# Patient Record
Sex: FEMALE | Employment: FULL TIME | URBAN - METROPOLITAN AREA
[De-identification: names, ages, dates, MRNs, and addresses within clinical notes are randomized per-mention and may not be internally consistent; named-entity substitution may affect disease eponyms.]

---

## 2017-09-01 ENCOUNTER — HOSPITAL ENCOUNTER (OUTPATIENT)
Dept: PHYSICAL THERAPY | Age: 42
Discharge: HOME | End: 2017-09-01
Payer: COMMERCIAL

## 2017-09-01 PROCEDURE — 97110 THERAPEUTIC EXERCISES: CPT

## 2017-09-01 PROCEDURE — 97162 PT EVAL MOD COMPLEX 30 MIN: CPT

## 2017-09-01 NOTE — PROGRESS NOTES
[facility]  [address]  [place], [address]  [phone] fax    Plan of Care/ Statement of Necessity for Physical Therapy Services    Patient name: Doug Quintana Start of Care: 2017   Referral source: Daquan Ross MD : 1975    Medical Diagnosis: Pain in right knee [M25.561]  Chondromalacia, right knee [M94.261]   Onset Date: 6-7 weeks prior to onset. Treatment Diagnosis: R knee pain   Prior Hospitalization: see medical history Provider#: [id number]   Medications: Verified on Patient summary List    Comorbidities: Thyroid problems, Bilateral leg circulation/swelling, visual impaired (contacts), hearing impaired, thyroidectomy   Prior Level of Function: The patient states that she had no problems performing squats, stairs, and walking. The Plan of Care and following information is based on the information from the initial evaluation. Assessment/ key information: The patient is a 38year old female with a chief complaint of R knee pain that began with an onset when she was climbing a ladder at work. Since that time she has experienced pain on either side of her knee and below the patella. She does state she has had radiographs which were negative and MRI which showed cartilage involvement per patient. She has signs and symptoms consistent with knee pain with associated impairments consisting of pain, decreased ROM, decreased flexibility, decreased strength, and limited ADL efficiency. The patient will benefit from skilled PT in order to address the above impairments. Evaluation Complexity History HIGH Complexity :3+ comorbidities / personal factors will impact the outcome/ POC ; Examination MEDIUM Complexity : 3 Standardized tests and measures addressing body structure, function, activity limitation and / or participation in recreation  ;Presentation MEDIUM Complexity : Evolving with changing characteristics  ; Clinical Decision Making MEDIUM Complexity : FOTO score of 26-74  Overall Complexity Rating: MEDIUM  Problem List: pain affecting function, decrease ROM, decrease strength, edema affecting function, decrease ADL/ functional abilitiies, decrease activity tolerance, decrease flexibility/ joint mobility and decrease transfer abilities   Treatment Plan may include any combination of the following: Therapeutic exercise, Therapeutic activities, Neuromuscular re-education, Physical agent/modality, Manual therapy, Patient education, Self Care training and Functional mobility training  Patient / Family readiness to learn indicated by: asking questions, trying to perform skills and interest  Persons(s) to be included in education: patient (P)  Barriers to Learning/Limitations: None  Patient Goal (s): decrease in pain/swelling  Patient Self Reported Health Status: fair  Rehabilitation Potential: good    Short Term Goals: To be accomplished in 2 weeks:   1. The patient will be independent and compliant with HEP to maximize therapeutic benefit. 2. The patient will improve knee extension to 0 degrees to maximize ease of ambulation. Long Term Goals: To be accomplished in 4 weeks:   1. The patient will improve FOTO score to predicted value in order to maximize ADL ease. 2. The patient will improve knee extension strength to 4+/5 MMT to maximize stability in stance. 3. The patient will improve knee flexion to 120 degrees to improve ease of strength. 4. The patient will display equal stair negotiation to maximize ADL ease. Frequency / Duration: Patient to be seen 2 times per week for 4 weeks.     Patient/ Caregiver education and instruction: Diagnosis, prognosis, self care, activity modification and exercises   [x]  Plan of care has been reviewed with JEOVANNY Ochoa, PT 9/1/2017 4:07 PM    ________________________________________________________________________    I certify that the above Therapy Services are being furnished while the patient is under my care. I agree with the treatment plan and certify that this therapy is necessary.     [de-identified] Signature:____________________  Date:____________Time: _________    Please sign and return to [facility]  [address] [facility] [address]  [place], [address]  [phone] fax

## 2017-09-01 NOTE — PROGRESS NOTES
PT DAILY TREATMENT NOTE - [facility]     Patient Name: Wale Taylor  Date:2017  : 1975  [x]  Patient  Verified  Payor: Brian Freeman / Plan: Ashish Hercules RPN / Product Type: Commerical /    In time:3:05  Out time:4:50  Total Treatment Time (min): 45  Visit #: 1 of 8    Treatment Area: Pain in right knee [M25.561]  Chondromalacia, right knee [M94.261]    SUBJECTIVE  Pain Level (0-10 scale): 8/10  Any medication changes, allergies to medications, adverse drug reactions, diagnosis change, or new procedure performed?: [x] No    [] Yes (see summary sheet for update)  Subjective functional status/changes:   [] No changes reported  The patient states that she has a chief complaint of R knee pain that has an onset of about 6-7 weeks ago. OBJECTIVE  15 min Therapeutic Exercise:  [x] See flow sheet :   Rationale: increase ROM and increase strength to improve the patients ability to improve ADL ease. With   [] TE   [] TA   [] neuro   [] other: Patient Education: [x] Review HEP    [] Progressed/Changed HEP based on:   [] positioning   [] body mechanics   [] transfers   [] heat/ice application    [] other:      Other Objective/Functional Measures: See IE     Pain Level (0-10 scale) post treatment: 8/10    ASSESSMENT/Changes in Function:  See POC. Patient will continue to benefit from skilled PT services to modify and progress therapeutic interventions, address functional mobility deficits, address ROM deficits, address strength deficits, analyze and address soft tissue restrictions, analyze and cue movement patterns, analyze and modify body mechanics/ergonomics, assess and modify postural abnormalities and instruct in home and community integration to attain remaining goals. [x]  See Plan of Care  []  See progress note/recertification  []  See Discharge Summary         Progress towards goals / Updated goals:  Short Term Goals:  To be accomplished in 2 weeks:                         1. The patient will be independent and compliant with HEP to maximize therapeutic benefit. 2. The patient will improve knee extension to 0 degrees to maximize ease of ambulation. Long Term Goals: To be accomplished in 4 weeks:                         1. The patient will improve FOTO score to predicted value in order to maximize ADL ease. 2. The patient will improve knee extension strength to 4+/5 MMT to maximize stability in stance. 3. The patient will improve knee flexion to 120 degrees to improve ease of strength. 4. The patient will display equal stair negotiation to maximize ADL ease.     PLAN  []  Upgrade activities as tolerated     [x]  Continue plan of care  []  Update interventions per flow sheet       []  Discharge due to:_  []  Other:_      Kelsea Foley, PT 9/1/2017  4:21 PM    Future Appointments  Date Time Provider Tran [place]   9/5/2017 7:30 AM [address] [place] [address]   9/7/2017 9:30 AM Delia Boone PTA [facility]

## 2017-09-05 ENCOUNTER — HOSPITAL ENCOUNTER (OUTPATIENT)
Dept: PHYSICAL THERAPY | Age: 42
Discharge: HOME | End: 2017-09-05
Payer: COMMERCIAL

## 2017-09-05 PROCEDURE — 97110 THERAPEUTIC EXERCISES: CPT

## 2017-09-05 PROCEDURE — 97016 VASOPNEUMATIC DEVICE THERAPY: CPT

## 2017-09-05 NOTE — PROGRESS NOTES
PT DAILY TREATMENT NOTE 12    Patient Name: Nicolle Ryder  Date:2017  : 1975  [x]  Patient  Verified  Payor: Morro Garcia / Plan: Ashish Thomas RPN / Product Type: Commerical /    In time: 7:30am  Out time:8:22am  Total Treatment Time (min): 52  Visit #: 2 of 8    Treatment Area: Pain in right knee [M25.561]  Chondromalacia, right knee [M94.261]    SUBJECTIVE  Pain Level (0-10 scale): 8  Any medication changes, allergies to medications, adverse drug reactions, diagnosis change, or new procedure performed?: [x] No    [] Yes (see summary sheet for update)  Subjective functional status/changes:   [] No changes reported  \"It's a little sore and it's burning on the inside. \"    OBJECTIVE    42 min Therapeutic Exercise:  [x] See flow sheet :   Rationale: increase ROM and increase strength to improve the patients ability to improve ease of ADLs. Modality rationale: decrease inflammation and decrease pain to improve the patients ability to improve ADL ease.    Min Type Additional Details    [] Estim:  []Unatt       []IFC  []Premod                        []Other:  []w/ice   []w/heat  Position:  Location:    [] Estim: []Att    []TENS instruct  []NMES                    []Other:  []w/US   []w/ice   []w/heat  Position:  Location:    []  Traction: [] Cervical       []Lumbar                       [] Prone          []Supine                       []Intermittent   []Continuous Lbs:  [] before manual  [] after manual    []  Ultrasound: []Continuous   [] Pulsed                           []1MHz   []3MHz Location:  W/cm2:    []  Iontophoresis with dexamethasone         Location: [] Take home patch   [] In clinic    []  Ice     []  heat  []  Ice massage  []  Laser   []  Anodyne Position:  Location:    []  Laser with stim  []  Other: Position:  Location:   10 [x]  Vasopneumatic Device Pressure:       [x] lo [] med [] hi   Temperature: [x] lo [] med [] hi   [] Skin assessment post-treatment:  []intact []redness- no adverse reaction    []redness - adverse reaction:           With   [] TE   [] TA   [] neuro   [] other: Patient Education: [x] Review HEP    [] Progressed/Changed HEP based on:   [] positioning   [] body mechanics   [] transfers   [] heat/ice application    [] other:      Other Objective/Functional Measures:    Somewhat poor tolerance to knee flexion, achieves ~90 degrees knee flexion in open chain, less in closed    Pt describes and c/o non-specific knee \"burning\" sensation with most interventions that she reports is more medial in her knee and occasionally occurs along with \"clicking\"     Initial FOTO completed: 32    Pain Level (0-10 scale) post treatment: 0    ASSESSMENT/Changes in Function: Pt reports fairly non-specific knee \"burning\" during treatment that abolished post treatment, likely d/t modality use. She reports c/o clicking. Will trial patellar taping NV and assess response. Patient will continue to benefit from skilled PT services to modify and progress therapeutic interventions, address functional mobility deficits, address ROM deficits, address strength deficits, analyze and address soft tissue restrictions, analyze and cue movement patterns, analyze and modify body mechanics/ergonomics, assess and modify postural abnormalities and instruct in home and community integration to attain remaining goals. []  See Plan of Care  []  See progress note/recertification  []  See Discharge Summary         Progress towards goals / Updated goals:  Short Term Goals: To be accomplished in 2 weeks:                         1. The patient will be independent and compliant with HEP to maximize therapeutic benefit. Pt reports compliance 9/5/2017                         2. The patient will improve knee extension to 0 degrees to maximize ease of ambulation. Long Term Goals: To be accomplished in 4 weeks:                         1. The patient will improve FOTO score to predicted value in order to maximize ADL ease. 2. The patient will improve knee extension strength to 4+/5 MMT to maximize stability in stance. 3. The patient will improve knee flexion to 120 degrees to improve ease of strength. 4. The patient will display equal stair negotiation to maximize ADL ease.     PLAN  [x]  Upgrade activities as tolerated     [x]  Continue plan of care  []  Update interventions per flow sheet       []  Discharge due to:_  []  Other:_      Arcelia Mccann, PT, DPT, ATC, CSCS 9/5/2017  7:20 AM    Future Appointments  Date Time Provider Tran [place]   9/5/2017 7:30 AM [address] [place] [address]   9/7/2017 9:30 AM [facility]

## 2017-09-07 ENCOUNTER — HOSPITAL ENCOUNTER (OUTPATIENT)
Dept: PHYSICAL THERAPY | Age: 42
Discharge: HOME | End: 2017-09-07
Payer: COMMERCIAL

## 2017-09-07 PROCEDURE — 97112 NEUROMUSCULAR REEDUCATION: CPT

## 2017-09-07 PROCEDURE — 97110 THERAPEUTIC EXERCISES: CPT

## 2017-09-07 NOTE — PROGRESS NOTES
PT DAILY TREATMENT NOTE     Patient Name: Yvon Lewis  Date:2017  : 1975  [x]  Patient  Verified  Payor: Mari Pepper / Plan: Tatiana Sharpe RPN / Product Type: Commerical /    In time:9:35  Out time:10:05  Total Treatment Time (min): 30  Visit #: 3 of 8    Treatment Area: Pain in right knee [M25.561]  Chondromalacia, right knee [M94.261]    SUBJECTIVE  Pain Level (0-10 scale): 8/10  Any medication changes, allergies to medications, adverse drug reactions, diagnosis change, or new procedure performed?: [x] No    [] Yes (see summary sheet for update)  Subjective functional status/changes:   [] No changes reported  Pt reports she pushed herself too much last visit. OBJECTIVE    20 min Therapeutic Exercise:  [x] See flow sheet :   Rationale: increase ROM and increase strength to improve the patients ability to tolerate ADLs. 10 min Neuromuscular Re-education:  [x]  See flow sheet :   Rationale: increase strength, improve coordination and increase proprioception  to improve the patients ability to perform functional activities. With   [] TE   [] TA   [] neuro   [] other: Patient Education: [x] Review HEP    [] Progressed/Changed HEP based on:   [] positioning   [] body mechanics   [] transfers   [] heat/ice application    [] other:      Other Objective/Functional Meaures: Pt demonstrates improved knee extension approaching 0 degrees. Pain Level (0-10 scale) post treatment: 0/10    ASSESSMENT/Changes in Function: Mod vc's to avoid anterior shift when performing squats. Patient will continue to benefit from skilled PT services to modify and progress therapeutic interventions, address functional mobility deficits, address ROM deficits, address strength deficits, analyze and address soft tissue restrictions, analyze and cue movement patterns and analyze and modify body mechanics/ergonomics to attain remaining goals.      []  See Plan of Care  []  See progress note/recertification  [] See Discharge Summary         Progress towards goals / Updated goals:  Short Term Goals: To be accomplished in 2 weeks:                         3. The patient will be independent and compliant with HEP to maximize therapeutic benefit. Pt reports compliance 9/5/2017                         2. The patient will improve knee extension to 0 degrees to maximize ease of ambulation. - progressing per visual assessment. 09/07/17  Long Term Goals: To be accomplished in 4 weeks:                         0. The patient will improve FOTO score to predicted value in order to maximize ADL ease.                         2. The patient will improve knee extension strength to 4+/5 MMT to maximize stability in stance.                         3. The patient will improve knee flexion to 120 degrees to improve ease of strength.                         4. The patient will display equal stair negotiation to maximize ADL ease.       PLAN  []  Upgrade activities as tolerated     [x]  Continue plan of care  []  Update interventions per flow sheet       []  Discharge due to:_  []  Other:_      Maggie Ramirez, PTA 9/7/2017  9:39 AM    No future appointments.

## 2017-09-11 ENCOUNTER — HOSPITAL ENCOUNTER (OUTPATIENT)
Dept: PHYSICAL THERAPY | Age: 42
Discharge: HOME | End: 2017-09-11
Payer: COMMERCIAL

## 2017-09-11 PROCEDURE — 97110 THERAPEUTIC EXERCISES: CPT

## 2017-09-11 NOTE — PROGRESS NOTES
PT DAILY TREATMENT NOTE     Patient Name: Yvon Lewis  Date:2017  : 1975  [x]  Patient  Verified  Payor: Mari Pepper / Plan: Tatiana Sharpe RPN / Product Type: Commerical /    In time:11:06  Out time: 11:48  Total Treatment Time (min): 42  Visit #: 4 of 8    Treatment Area: Pain in right knee [M25.561]  Chondromalacia, right knee [M94.261]    SUBJECTIVE  Pain Level (0-10 scale): 8/10  Any medication changes, allergies to medications, adverse drug reactions, diagnosis change, or new procedure performed?: [x] No    [] Yes (see summary sheet for update)  Subjective functional status/changes:   [] No changes reported  The patient states that her knee is not any better. No change. OBJECTIVE  Modality rationale: decrease edema, decrease inflammation and decrease pain to improve the patients ability to improve ADL ease.     Min Type Additional Details    [] Estim:  []Unatt       []IFC  []Premod                        []Other:  []w/ice   []w/heat  Position:  Location:    [] Estim: []Att    []TENS instruct  []NMES                    []Other:  []w/US   []w/ice   []w/heat  Position:  Location:    []  Traction: [] Cervical       []Lumbar                       [] Prone          []Supine                       []Intermittent   []Continuous Lbs:  [] before manual  [] after manual    []  Ultrasound: []Continuous   [] Pulsed                           []1MHz   []3MHz W/cm2:  Location:    []  Iontophoresis with dexamethasone         Location: [] Take home patch   [] In clinic   10 [x]  Ice     []  heat  []  Ice massage  []  Laser   []  Anodyne Position: seated  Location: R knee    []  Laser with stim  []  Other:  Position:  Location:    []  Vasopneumatic Device Pressure:       [] lo [] med [] hi   Temperature: [] lo [] med [] hi   [] Skin assessment post-treatment:  []intact []redness- no adverse reaction    []redness - adverse reaction:     32 min Therapeutic Exercise:  [x] See flow sheet :   Rationale: increase ROM and increase strength to improve the patients ability to improve ADL ease. With   [] TE   [] TA   [] neuro   [] other: Patient Education: [x] Review HEP    [] Progressed/Changed HEP based on:   [] positioning   [] body mechanics   [] transfers   [] heat/ice application    [] other:      Other Objective/Functional Measures:   Slow progress with ROM: 0 - 87 degrees    Pain Level (0-10 scale) post treatment: 6/10    ASSESSMENT/Changes in Function: No change with PT up to this point. Discussed option of taping, but patient had lotion on R LE. Informed patient we may trial it next visit. Patient will continue to benefit from skilled PT services to modify and progress therapeutic interventions, address functional mobility deficits, address ROM deficits, address strength deficits, analyze and address soft tissue restrictions, analyze and cue movement patterns, analyze and modify body mechanics/ergonomics, assess and modify postural abnormalities and instruct in home and community integration to attain remaining goals. []  See Plan of Care  []  See progress note/recertification  []  See Discharge Summary         Progress towards goals / Updated goals:  Short Term Goals: To be accomplished in 2 weeks:                         2. The patient will be independent and compliant with HEP to maximize therapeutic benefit. Pt reports compliance 9/5/2017                         2. The patient will improve knee extension to 0 degrees to maximize ease of ambulation. - progressing per visual assessment. 09/07/17; Met - 0 degrees 9/11/2017. Long Term Goals: To be accomplished in 4 weeks:                         1. The patient will improve FOTO score to predicted value in order to maximize ADL ease.                         2. The patient will improve knee extension strength to 4+/5 MMT to maximize stability in stance.                         3. The patient will improve knee flexion to 120 degrees to improve ease of strength. Not met 9/11/2017.                         4.  The patient will display equal stair negotiation to maximize ADL ease.       PLAN  []  Upgrade activities as tolerated     [x]  Continue plan of care  []  Update interventions per flow sheet       []  Discharge due to:_  []  Other:_      Vilma Bah, PT 9/11/2017  1:18 PM    Future Appointments  Date Time Provider Tran [facility]   9/14/2017 8:30 AM Tray Yu PTA [facility]

## 2017-09-14 ENCOUNTER — APPOINTMENT (OUTPATIENT)
Dept: PHYSICAL THERAPY | Age: 42
End: 2017-09-14
Payer: COMMERCIAL

## 2017-09-25 ENCOUNTER — HOSPITAL ENCOUNTER (OUTPATIENT)
Dept: PHYSICAL THERAPY | Age: 42
Discharge: HOME | End: 2017-09-25
Payer: COMMERCIAL

## 2017-09-25 PROCEDURE — 97162 PT EVAL MOD COMPLEX 30 MIN: CPT

## 2017-09-25 PROCEDURE — 97161 PT EVAL LOW COMPLEX 20 MIN: CPT

## 2017-09-25 PROCEDURE — 97110 THERAPEUTIC EXERCISES: CPT

## 2017-09-25 NOTE — PROGRESS NOTES
[facility]  [address] [facility] [address]  [place], [address]  [phone] fax    Physical Therapy Discharge Summary  Patient name: Janice Palmer Start of Care: 2017   Referral source: Donnie Keane MD : 1975                          Medical Diagnosis: Pain in right knee [M25.561]  Chondromalacia, right knee [M94.261] Onset Date: 6-7 weeks prior to onset. Treatment Diagnosis: R knee pain   Prior Hospitalization: see medical history Provider#: [id number]   Medications: Verified on Patient summary List    Comorbidities: Thyroid problems, Bilateral leg circulation/swelling, visual impaired (contacts), hearing impaired, thyroidectomy   Prior Level of Function: The patient states that she had no problems performing squats, stairs, and walking. Visits from Start of Care: 4    Missed Visits: 0  Reporting Period : 2017 to 2017      Summary of Care:  Short Term Goals: To be accomplished in 2 weeks:                         1. The patient will be independent and compliant with HEP to maximize therapeutic benefit. Pt reports compliance 2017                         2. The patient will improve knee extension to 0 degrees to maximize ease of ambulation. - progressing per visual assessment. 17; Met - 0 degrees 2017. Long Term Goals: To be accomplished in 4 weeks:                         1. The patient will improve FOTO score to predicted value in order to maximize ADL ease.                         2. The patient will improve knee extension strength to 4+/5 MMT to maximize stability in stance.                         3. The patient will improve knee flexion to 120 degrees to improve ease of strength. Not met 2017.                         4. The patient will display equal stair negotiation to maximize ADL ease. ASSESSMENT/RECOMMENDATIONS: Pt followed up with second opinion MD. Opted to hold PT for now.  D/c due to new order from different MD.   [x]Discontinue therapy: []Patient has reached or is progressing toward set goals      []Patient is non-compliant or has abdicated      []Due to lack of appreciable progress towards set [id number] Julien, RAEANN 9/25/2017 9:32 AM

## 2017-09-25 NOTE — PROGRESS NOTES
[facility]  [address]  [place], [address]  [phone] fax    Plan of Care/ Statement of Necessity for Physical Therapy Services    Patient name: Chacorta Meier Start of Care: 2017   Referral source: Tatyana Hooks MD : 1975                          Medical Diagnosis: Pain in right knee [M25.561]  Chondromalacia, right knee [M94.261] Onset Date: 6-7 weeks prior to onset. Treatment Diagnosis: R knee pain   Prior Hospitalization: see medical history Provider#: [id number]   Medications: Verified on Patient summary List    Comorbidities: Thyroid problems, Bilateral leg circulation/swelling, visual impaired (contacts), hearing impaired, thyroidectomy   Prior Level of Function: The patient states that she had no problems performing squats, stairs, and walking. The Plan of Care and following information is based on the information from the initial evaluation. Assessment/ key information: The patient is a 38year old female with a chief complaint of R knee pain that began with an onset when she was climbing a ladder at work. Since that time she has experienced pain on either side of her knee and below the patella. She does state she has had radiographs which were negative and MRI which showed cartilage involvement per patient. She recently had a follow-up with second opinion which referred back to PT. The patient has impairments consisting of pain, decreased ROM, decreased flexibility, decreased strength and limited ADL efficiency. She will benefit from skilled PT in order to address the above impairments.     Evaluation Complexity History HIGH Complexity :3+ comorbidities / personal factors will impact the outcome/ POC ; Examination MEDIUM Complexity : 3 Standardized tests and measures addressing body structure, function, activity limitation and / or participation in recreation  ;Presentation MEDIUM Complexity : Evolving with changing characteristics  ; Clinical Decision Making MEDIUM Complexity : FOTO score of 26-74  Overall Complexity Rating: MEDIUM  Problem List: pain affecting function, decrease ROM, decrease strength, edema affecting function, decrease ADL/ functional abilitiies, decrease activity tolerance, decrease flexibility/ joint mobility and decrease transfer abilities                         Treatment Plan may include any combination of the following: Therapeutic exercise, Therapeutic activities, Neuromuscular re-education, Physical agent/modality, Manual therapy, Patient education, Self Care training and Functional mobility training  Patient / Family readiness to learn indicated by: asking questions, trying to perform skills and interest  Persons(s) to be included in education: patient (P)  Barriers to Learning/Limitations: None  Patient Goal (s): decrease in pain/swelling  Patient Self Reported Health Status: fair  Rehabilitation Potential: good    Short Term Goals: To be accomplished in 2 weeks:                         1. The patient will be independent and compliant with HEP to maximize therapeutic benefit. Long Term Goals: To be accomplished in 4 weeks:                         1. The patient will improve FOTO score to predicted value in order to maximize ADL ease. 2. The patient will improve knee extension strength to 4+/5 MMT to maximize stability in stance. 3. The patient will improve knee flexion to 115 degrees to improve ease of strength. 4. The patient will display equal stair negotiation to maximize ADL ease. Frequency / Duration: Patient to be seen 2 times per week for 4 weeks.     Patient/ Caregiver education and instruction: Diagnosis, prognosis, self care, activity modification and exercises   [x]  Plan of care has been reviewed with JEOVANNY Lindquist, PT 9/25/2017 9:35 AM    ________________________________________________________________________    I certify that the above Therapy Services are being furnished while the patient is under my care. I agree with the treatment plan and certify that this therapy is necessary.     [de-identified] Signature:____________________  Date:____________Time: _________    Please sign and return to [facility] [address]  [place], [address]  [phone] fax

## 2017-09-25 NOTE — PROGRESS NOTES
PT DAILY TREATMENT NOTE     Patient Name: Lexus Longoria  Date:2017  : 1975  [x]  Patient  Verified  Payor: Carmelina Sampson / Plan: Tawnya Clinton RPN / Product Type: Commerical /    In time:9:03  Out time:9:38  Total Treatment Time (min): 35  Visit #: 1 of 8    Treatment Area: Right knee pain [M25.561]    SUBJECTIVE  Pain Level (0-10 scale): 8/10  Any medication changes, allergies to medications, adverse drug reactions, diagnosis change, or new procedure performed?: [x] No    [] Yes (see summary sheet for update)  Subjective functional status/changes:   [] No changes reported  The patient has a chief complaint of R knee pain limiting stairs and prolonged standing/walking. OBJECTIVE  25 min [x]Eval                  []Re-Eval     10 min Therapeutic Exercise:  [x] See flow sheet :   Rationale: increase ROM and increase strength to improve the patients ability to improve ADL ease. With   [] TE   [] TA   [] neuro   [] other: Patient Education: [x] Review HEP    [] Progressed/Changed HEP based on:   [] positioning   [] body mechanics   [] transfers   [] heat/ice application    [] other:      Other Objective/Functional Measures: See IE     Pain Level (0-10 scale) post treatment: 8/10    ASSESSMENT/Changes in Function: See POC. Patient will continue to benefit from skilled PT services to modify and progress therapeutic interventions, address functional mobility deficits, address ROM deficits, address strength deficits, analyze and address soft tissue restrictions, analyze and cue movement patterns, analyze and modify body mechanics/ergonomics, assess and modify postural abnormalities and instruct in home and community integration to attain remaining goals. [x]  See Plan of Care  []  See progress note/recertification  []  See Discharge Summary         Progress towards goals / Updated goals:  Short Term Goals: To be accomplished in 2 weeks:                         9.  The patient will be independent and compliant with HEP to maximize therapeutic benefit. Long Term Goals: To be accomplished in 4 weeks:                         1. The patient will improve FOTO score to predicted value in order to maximize ADL ease.                         2. The patient will improve knee extension strength to 4+/5 MMT to maximize stability in stance.                         3. The patient will improve knee flexion to 115 degrees to improve ease of strength.                         4. The patient will display equal stair negotiation to maximize ADL ease.     PLAN  []  Upgrade activities as tolerated     [x]  Continue plan of care  []  Update interventions per flow sheet       []  Discharge due to:_  []  Other:_      Vilma Bah, PT 9/25/2017  9:39 AM    Future Appointments  Date Time Provider Tran [place]   9/27/2017 9:30 AM Tray Redo, PTA [facility]   10/3/2017 10:00 AM Vilma Bah, PT [facility]   10/5/2017 8:00 AM Tray Redo, PTA [facility]   10/10/2017 8:30 AM Tray Redo, PTA [facility]   10/12/2017 9:00 AM Tray Redo, PTA [facility]   10/17/2017 8:30 AM Tray Redo, PTA [facility]

## 2017-09-27 ENCOUNTER — HOSPITAL ENCOUNTER (OUTPATIENT)
Dept: PHYSICAL THERAPY | Age: 42
Discharge: HOME | End: 2017-09-27
Payer: COMMERCIAL

## 2017-09-27 PROCEDURE — 97110 THERAPEUTIC EXERCISES: CPT

## 2017-09-27 PROCEDURE — 97112 NEUROMUSCULAR REEDUCATION: CPT

## 2017-09-27 NOTE — PROGRESS NOTES
PT DAILY TREATMENT NOTE     Patient Name: Angle Lundberg  Date:2017  : 1975  [x]  Patient  Verified  Payor: Patricia Aguirre / Plan: Rhiannon AGUILAR / Product Type: Commerical /    In time:9:30  Out time:10:13  Total Treatment Time (min): 43  Visit #: 2 of 8    Treatment Area: Right knee pain [M25.561]    SUBJECTIVE  Pain Level (0-10 scale): 8/10  Any medication changes, allergies to medications, adverse drug reactions, diagnosis change, or new procedure performed?: [x] No    [] Yes (see summary sheet for update)  Subjective functional status/changes:   [] No changes reported  Pt reports no new complaints. Pt reports her knee is still painful today. OBJECTIVE    Modality rationale: decrease inflammation and decrease pain to improve the patients ability to tolerate ADLs.     Min Type Additional Details    [] Estim:  []Unatt       []IFC  []Premod                        []Other:  []w/ice   []w/heat  Position:  Location:    [] Estim: []Att    []TENS instruct  []NMES                    []Other:  []w/US   []w/ice   []w/heat  Position:  Location:    []  Traction: [] Cervical       []Lumbar                       [] Prone          []Supine                       []Intermittent   []Continuous Lbs:  [] before manual  [] after manual    []  Ultrasound: []Continuous   [] Pulsed                           []1MHz   []3MHz W/cm2:  Location:    []  Iontophoresis with dexamethasone         Location: [] Take home patch   [] In clinic   10 [x]  Ice     []  heat  []  Ice massage  []  Laser   []  Anodyne Position:sitting  Location:(R) knee    []  Laser with stim  []  Other:  Position:  Location:    []  Vasopneumatic Device Pressure:       [] lo [] med [] hi   Temperature: [] lo [] med [] hi   [] Skin assessment post-treatment:  []intact []redness- no adverse reaction    []redness - adverse reaction:     23 min Therapeutic Exercise:  [x] See flow sheet :   Rationale: increase ROM and increase strength to improve the patients ability to tolerate ADLs. 10 min Neuromuscular Re-education:  [x]  See flow sheet :   Rationale: increase strength, improve coordination and increase proprioception  to improve the patients ability to perform functional activities. With   [] TE   [] TA   [] neuro   [] other: Patient Education: [x] Review HEP    [] Progressed/Changed HEP based on:   [] positioning   [] body mechanics   [] transfers   [] heat/ice application    [] other:      Other Objective/Functional Measures: initiated exercises as per flow sheet. Pain Level (0-10 scale) post treatment: 3-4/10    ASSESSMENT/Changes in Function: Pt has good carry over with vc's for form. Patient will continue to benefit from skilled PT services to modify and progress therapeutic interventions, address functional mobility deficits, address ROM deficits, address strength deficits, analyze and address soft tissue restrictions and analyze and cue movement patterns to attain remaining goals. []  See Plan of Care  []  See progress note/recertification  []  See Discharge Summary         Progress towards goals / Updated goals:  Short Term Goals: To be accomplished in 2 weeks:                         8. The patient will be independent and compliant with HEP to maximize therapeutic benefit. - Met per patient report. 09/27/17  Long Term Goals: To be accomplished in 4 weeks:                         3. The patient will improve FOTO score to predicted value in order to maximize ADL ease.                         2. The patient will improve knee extension strength to 4+/5 MMT to maximize stability in stance.                         3. The patient will improve knee flexion to 115 degrees to improve ease of strength.                         4.  The patient will display equal stair negotiation to maximize ADL ease.       PLAN  []  Upgrade activities as tolerated     [x]  Continue plan of care  []  Update interventions per flow sheet       [] Discharge due to:_  []  Other:_      Cesaramy Mckenzie, PTA 9/27/2017  9:51 AM    Future Appointments  Date Time Provider Tran [place]   10/3/2017 10:00 AM Diomedes Lopez, PT [facility]   10/5/2017 8:00 AM Cesar Mendon, PTA [facility]   10/10/2017 8:30 AM Cesar Mendon, PTA [facility]   10/12/2017 9:00 AM Cesar Mendon, PTA [facility]   10/17/2017 8:30 AM Cesar Mendon, PTA [facility]

## 2017-10-03 ENCOUNTER — HOSPITAL ENCOUNTER (OUTPATIENT)
Dept: PHYSICAL THERAPY | Age: 42
Discharge: HOME | End: 2017-10-03
Payer: COMMERCIAL

## 2017-10-03 PROCEDURE — 97033 APP MDLTY 1+IONTPHRSIS EA 15: CPT

## 2017-10-03 PROCEDURE — 97110 THERAPEUTIC EXERCISES: CPT

## 2017-10-03 PROCEDURE — 97112 NEUROMUSCULAR REEDUCATION: CPT

## 2017-10-03 NOTE — PROGRESS NOTES
PT DAILY TREATMENT NOTE     Patient Name: Elayne Malik  Date:10/3/2017  : 1975  [x]  Patient  Verified  Payor: Gustavo Becerra / Plan: BSHSI WOJCIECH ESTEVESN / Product Type: Commerical /    In time:10:10  Out time:10:40  Total Treatment Time (min): 30  Visit #: 3 of 8    Treatment Area: Right knee pain [M25.561]    SUBJECTIVE  Pain Level (0-10 scale): 9/10  Any medication changes, allergies to medications, adverse drug reactions, diagnosis change, or new procedure performed?: [x] No    [] Yes (see summary sheet for update)  Subjective functional status/changes:   [] No changes reported  The patient states that her knee is feeling worse today. She does not report any mode of injury that caused this. She states she is spacing the exercises out further so that it doesn't cause further pain. OBJECTIVE  . Modality rationale: decrease edema, decrease inflammation and decrease pain to improve the patients ability to improve ADL ease. Min Type Additional Details   8 [x]  Iontophoresis with dexamethasone         Location: [x] Take home patch   [] In clinic   [] Skin assessment post-treatment:  []intact []redness- no adverse reaction    []redness - adverse reaction:     14 min Therapeutic Exercise:  [x] See flow sheet :   Rationale: increase ROM and increase strength to improve the patients ability to improve ADL ease. 8 min Neuromuscular Re-education:  [x]  See flow sheet :   Rationale: increase ROM and increase strength  to improve the patients ability to improve ADL ease. With   [] TE   [] TA   [] neuro   [] other: Patient Education: [x] Review HEP    [] Progressed/Changed HEP based on:   [] positioning   [] body mechanics   [] transfers   [] heat/ice application    [] other:      Other Objective/Functional Measures:  Slow progress with regards to pain control up to this point. Her knee ROM is limited, but appears to be more empty end-feel.      Pain Level (0-10 scale) post treatment: 7/10    ASSESSMENT/Changes in Function: Slow progress up to this point. No change with regards to pain. Patient will continue to benefit from skilled PT services to modify and progress therapeutic interventions, address functional mobility deficits, address ROM deficits, address strength deficits, analyze and address soft tissue restrictions, analyze and cue movement patterns, analyze and modify body mechanics/ergonomics, address imbalance/dizziness and instruct in home and community integration to attain remaining goals. []  See Plan of Care  []  See progress note/recertification  []  See Discharge Summary         Progress towards goals / Updated goals:  Short Term Goals: To be accomplished in 2 weeks:                         1. The patient will be independent and compliant with HEP to maximize therapeutic benefit. - Met per patient report. 09/27/17  Long Term Goals: To be accomplished in 4 weeks:                         9. The patient will improve FOTO score to predicted value in order to maximize ADL ease.                         2. The patient will improve knee extension strength to 4+/5 MMT to maximize stability in stance.                         3. The patient will improve knee flexion to 115 degrees to improve ease of stairs. Not met 10/03/2017.                         4.  The patient will display equal stair negotiation to maximize ADL ease.     PLAN  []  Upgrade activities as tolerated     [x]  Continue plan of care  []  Update interventions per flow sheet       []  Discharge due to:_  []  Other:_      Royal Jaramillo, PT 10/3/2017  10:15 AM    Future Appointments  Date Time Provider Tran Jackson   10/5/2017 8:00 AM Alyssa Blades, PTA [facility]   10/10/2017 8:30 AM Alyssa Blades, PTA [facility]   10/12/2017 9:00 AM Alyssa Blades, PTA [facility]   10/17/2017 8:30 AM Alyssa Blades, PTA [facility]

## 2017-10-05 ENCOUNTER — APPOINTMENT (OUTPATIENT)
Dept: PHYSICAL THERAPY | Age: 42
End: 2017-10-05
Payer: COMMERCIAL

## 2017-10-10 ENCOUNTER — HOSPITAL ENCOUNTER (OUTPATIENT)
Dept: PHYSICAL THERAPY | Age: 42
Discharge: HOME | End: 2017-10-10
Payer: COMMERCIAL

## 2017-10-10 PROCEDURE — 97033 APP MDLTY 1+IONTPHRSIS EA 15: CPT

## 2017-10-10 PROCEDURE — 97112 NEUROMUSCULAR REEDUCATION: CPT

## 2017-10-10 PROCEDURE — 97110 THERAPEUTIC EXERCISES: CPT

## 2017-10-10 NOTE — PROGRESS NOTES
PT DAILY TREATMENT NOTE     Patient Name: Manny Riggs  Date:10/10/2017  : 1975  [x]  Patient  Verified  Payor: Rigoberto Hayes / Plan: Claude Dimmer RPN / Product Type: Commerical /    In time:8:39  Out time:9:06  Total Treatment Time (min): 27  Visit #: 4 of 8    Treatment Area: Right knee pain [M25.561]    SUBJECTIVE  Pain Level (0-10 scale): 8/10   Any medication changes, allergies to medications, adverse drug reactions, diagnosis change, or new procedure performed?: [x] No    [] Yes (see summary sheet for update)  Subjective functional status/changes:   [] No changes reported  Pt reports decreased pain after ionto patch last visit. Pt states she had some swelling hours after and the next day but thinks its from increased walking due to feeling better. OBJECTIVE    Modality rationale: decrease inflammation and decrease pain to improve the patients ability to tolerate ADLs.     Min Type Additional Details    [] Estim:  []Unatt       []IFC  []Premod                        []Other:  []w/ice   []w/heat  Position:  Location:    [] Estim: []Att    []TENS instruct  []NMES                    []Other:  []w/US   []w/ice   []w/heat  Position:  Location:    []  Traction: [] Cervical       []Lumbar                       [] Prone          []Supine                       []Intermittent   []Continuous Lbs:  [] before manual  [] after manual    []  Ultrasound: []Continuous   [] Pulsed                           []1MHz   []3MHz W/cm2:  Location:   8 [x]  Iontophoresis with dexamethasone         Location:(R) patellar tendon [x] Take home patch   [] In clinic    []  Ice     []  heat  []  Ice massage  []  Laser   []  Anodyne Position:  Location:    []  Laser with stim  []  Other:  Position:  Location:    []  Vasopneumatic Device Pressure:       [] lo [] med [] hi   Temperature: [] lo [] med [] hi   [] Skin assessment post-treatment:  []intact []redness- no adverse reaction    []redness - adverse reaction:       9 min Therapeutic Exercise:  [x] See flow sheet :   Rationale: increase ROM and increase strength to improve the patients ability to tolerate ADLs. 10 min Neuromuscular Re-education:  [x]  See flow sheet :   Rationale: increase strength, improve coordination and increase proprioception  to improve the patients ability to perform functional activities. With   [] TE   [] TA   [] neuro   [] other: Patient Education: [x] Review HEP    [] Progressed/Changed HEP based on:   [] positioning   [] body mechanics   [] transfers   [] heat/ice application    [] other:      Other Objective/Functional Measures: Held exercises per flow sheet due to patient arriving late. Pain Level (0-10 scale) post treatment: 8/10    ASSESSMENT/Changes in Function: Pt continues to have no change in pain with PT. Consider holding ionto patch next visit if patient has adverse reaction. Patient will continue to benefit from skilled PT services to modify and progress therapeutic interventions, address functional mobility deficits, address ROM deficits, address strength deficits, analyze and address soft tissue restrictions and analyze and cue movement patterns to attain remaining goals. []  See Plan of Care  []  See progress note/recertification  []  See Discharge Summary         Progress towards goals / Updated goals:  Short Term Goals: To be accomplished in 2 weeks:                         4. The patient will be independent and compliant with HEP to maximize therapeutic benefit. - Met per patient report. 09/27/17  Long Term Goals: To be accomplished in 4 weeks:                         1. The patient will improve FOTO score to predicted value in order to maximize ADL ease.                         2. The patient will improve knee extension strength to 4+/5 MMT to maximize stability in stance.                         3. The patient will improve knee flexion to 115 degrees to improve ease of stairs.  Not met 10/03/2017.                         4.  The patient will display equal stair negotiation to maximize ADL ease.       PLAN  []  Upgrade activities as tolerated     [x]  Continue plan of care  []  Update interventions per flow sheet       []  Discharge due to:_  []  Other:_      Amparo Fletcher PTA 10/10/2017  9:08 AM    Future Appointments  Date Time Provider Tran [facility]   10/12/2017 9:00 AM Amparo Fletcher PTA [facility]   10/17/2017 8:30 AM Amparo Fletcher PTA [facility]

## 2017-10-12 ENCOUNTER — HOSPITAL ENCOUNTER (OUTPATIENT)
Dept: PHYSICAL THERAPY | Age: 42
Discharge: HOME | End: 2017-10-12
Payer: COMMERCIAL

## 2017-10-12 PROCEDURE — 97033 APP MDLTY 1+IONTPHRSIS EA 15: CPT

## 2017-10-12 PROCEDURE — 97110 THERAPEUTIC EXERCISES: CPT

## 2017-10-12 NOTE — PROGRESS NOTES
PT DAILY TREATMENT NOTE     Patient Name: Dariela Knott  Date:10/12/2017  : 1975  [x]  Patient  Verified  Payor: Ngoc Burgos / Plan: Aurea Li RPN / Product Type: Commerical /    In time:9:13  Out time:9:36  Total Treatment Time (min): 23  Visit #: 5 of 8    Treatment Area: Right knee pain [M25.561]    SUBJECTIVE  Pain Level (0-10 scale): 9/10  Any medication changes, allergies to medications, adverse drug reactions, diagnosis change, or new procedure performed?: [x] No    [] Yes (see summary sheet for update)  Subjective functional status/changes:   [] No changes reported  Pt reports no change in symptoms. Pt reports compliance with HEP. OBJECTIVE    Modality rationale: decrease inflammation and decrease pain to improve the patients ability to perform functional activities.     Min Type Additional Details    [] Estim:  []Unatt       []IFC  []Premod                        []Other:  []w/ice   []w/heat  Position:  Location:    [] Estim: []Att    []TENS instruct  []NMES                    []Other:  []w/US   []w/ice   []w/heat  Position:  Location:    []  Traction: [] Cervical       []Lumbar                       [] Prone          []Supine                       []Intermittent   []Continuous Lbs:  [] before manual  [] after manual    []  Ultrasound: []Continuous   [] Pulsed                           []1MHz   []3MHz W/cm2:  Location:         8 [x]  Iontophoresis with dexamethasone         Location:(R) patellar tendon [x] Take home patch   [] In clinic    []  Ice     []  heat  []  Ice massage  []  Laser   []  Anodyne Position:  Location:    []  Laser with stim  []  Other:  Position:  Location:    []  Vasopneumatic Device Pressure:       [] lo [] med [] hi   Temperature: [] lo [] med [] hi   [] Skin assessment post-treatment:  []intact []redness- no adverse reaction    []redness - adverse reaction:     15 min Therapeutic Exercise:  [x] See flow sheet :   Rationale: increase ROM and increase strength to improve the patients ability to tolerate ADLs. With   [] TE   [] TA   [] neuro   [] other: Patient Education: [x] Review HEP    [] Progressed/Changed HEP based on:   [] positioning   [] body mechanics   [] transfers   [] heat/ice application    [] other:      Other Objective/Functional Measures: Pt continues to perform all exercises but complains of significant pain on 0-10 scale. Pain Level (0-10 scale) post treatment: 9/10    ASSESSMENT/Changes in Function: consider DC in 1-2 visits and refer back to MD due to lack of progress toward  Functional goals. Patient will continue to benefit from skilled PT services to modify and progress therapeutic interventions, address functional mobility deficits, address ROM deficits, address strength deficits, analyze and address soft tissue restrictions, analyze and cue movement patterns and analyze and modify body mechanics/ergonomics to attain remaining goals. []  See Plan of Care  []  See progress note/recertification  []  See Discharge Summary         Progress towards goals / Updated goals:  Short Term Goals: To be accomplished in 2 weeks:                         3. The patient will be independent and compliant with HEP to maximize therapeutic benefit. - Met per patient report. 09/27/17  Long Term Goals: To be accomplished in 4 weeks:                         2. The patient will improve FOTO score to predicted value in order to maximize ADL ease.                         2. The patient will improve knee extension strength to 4+/5 MMT to maximize stability in stance.                         3. The patient will improve knee flexion to 115 degrees to improve ease of stairs. Not met 10/03/2017.                         4. The patient will display equal stair negotiation to maximize ADL ease. - not met.  10/12/17       PLAN  []  Upgrade activities as tolerated     [x]  Continue plan of care  []  Update interventions per flow sheet       []  Discharge due to:_  []  Other:_      Abdirashid Webster, PTA 10/12/2017  9:27 AM    Future Appointments  Date Time Provider Tran [facility]   10/17/2017 8:30 AM Abdirashid Webster, PTA [facility]

## 2017-10-17 ENCOUNTER — APPOINTMENT (OUTPATIENT)
Dept: PHYSICAL THERAPY | Age: 42
End: 2017-10-17
Payer: COMMERCIAL

## 2017-10-31 ENCOUNTER — HOSPITAL ENCOUNTER (OUTPATIENT)
Dept: PHYSICAL THERAPY | Age: 42
Discharge: HOME | End: 2017-10-31
Payer: COMMERCIAL

## 2017-10-31 PROCEDURE — 97110 THERAPEUTIC EXERCISES: CPT

## 2017-10-31 PROCEDURE — 97112 NEUROMUSCULAR REEDUCATION: CPT

## 2017-10-31 NOTE — PROGRESS NOTES
PT DAILY TREATMENT NOTE     Patient Name: Srinivas Blank  Date:10/31/2017  : 1975  [x]  Patient  Verified  Payor: Afsaneh Carrillo / Plan: Ga Mackey RPN / Product Type: Commerical /    In time:8:09  Out time:8:40  Total Treatment Time (min): 31  Visit #: 6 of 8    Treatment Area: Right knee pain [M25.561]    SUBJECTIVE  Pain Level (0-10 scale): 8-9  Any medication changes, allergies to medications, adverse drug reactions, diagnosis change, or new procedure performed?: [x] No    [] Yes (see summary sheet for update)  Subjective functional status/changes:   [] No changes reported  Pt reports no change in symptoms. Pt states MD wants her to continue to strengthen. OBJECTIVE    23 min Therapeutic Exercise:  [x] See flow sheet :   Rationale: increase ROM and increase strength to improve the patients ability to tolerate ADLs. 8 min Neuromuscular Re-education:  [x]  See flow sheet :   Rationale: increase strength, improve coordination and increase proprioception  to improve the patients ability to perform functional.    With   [] TE   [] TA   [] neuro   [] other: Patient Education: [x] Review HEP    [] Progressed/Changed HEP based on:   [] positioning   [] body mechanics   [] transfers   [] heat/ice application    [] other:      Other Objective/Functional Measures: (B) knee extension . Pain Level (0-10 scale) post treatment: 9/10    ASSESSMENT/Changes in Function: Pt demonstrates no significant change in symptoms since IE. Issued and reviewed updated HEP with patient. Pt expressed understanding and agreed with plan to continue to perform HEP and follow up with referring MD as needed. []  See Plan of Care  []  See progress note/recertification  [x]  See Discharge Summary         Progress towards goals / Updated goals:  Short Term Goals: To be accomplished in 2 weeks:                         7. The patient will be independent and compliant with HEP to maximize therapeutic benefit.  - Met per patient report. 09/27/17  Long Term Goals: To be accomplished in 4 weeks:                         3. The patient will improve FOTO score to predicted value in order to maximize ADL ease.                          2. The patient will improve knee extension strength to 4+/5 MMT to maximize stability in stance. -not met 4/5 bilaterally. 10/31/17                         3. The patient will improve knee flexion to 115 degrees to improve ease of stairs. Not met 10/03/2017.                         4. The patient will display equal stair negotiation to maximize ADL ease. - not met. 10/12/17       PLAN  []  Upgrade activities as tolerated     []  Continue plan of care  []  Update interventions per flow sheet       [x]  Discharge due to:Lack of progress toward functional goals. []  Other:_      Lan Friend, PTA 10/31/2017  8:15 AM    No future appointments.

## 2017-11-09 NOTE — PROGRESS NOTES
[facility] [address]  [place], [address]  [phone] fax    Physical Therapy Discharge Summary  Patient name: Elayne Malik Start of Care: 2017   Referral source: Viki Casillas MD : 1975                          Medical Diagnosis: Pain in right knee [M25.561]  Chondromalacia, right knee [M94.261] Onset Date: 6-7 weeks prior to onset.                          Treatment Diagnosis: R knee pain   Prior Hospitalization: see medical history Provider#: [id number]   Medications: Verified on Patient summary List    Comorbidities: Thyroid problems, Bilateral leg circulation/swelling, visual impaired (contacts), hearing impaired, thyroidectomy   Prior Level of Function: The patient states that she had no problems performing squats, stairs, and walking. Visits from Start of Care: 6    Missed Visits: 2  Reporting Period : 2017 to 10/31/2017    Summary of Care:  Short Term Goals: To be accomplished in 2 weeks:                         1. The patient will be independent and compliant with HEP to maximize therapeutic benefit. - Met per patient report. 17  Long Term Goals: To be accomplished in 4 weeks:                         7. The patient will improve FOTO score to predicted value in order to maximize ADL ease.                          2. The patient will improve knee extension strength to 4+/5 MMT to maximize stability in stance. -not met 4/5 bilaterally. 10/31/17                         3. The patient will improve knee flexion to 115 degrees to improve ease of stairs. Not met 10/03/2017.                         4. The patient will display equal stair negotiation to maximize ADL ease. - not met. 10/12/17    ASSESSMENT/RECOMMENDATIONS:  Pt demonstrates no significant change in symptoms since IE. Issued and reviewed updated HEP with patient.   Pt expressed understanding and agreed with plan to continue to perform HEP and follow up with referring MD as needed.      [x]Discontinue therapy: []Patient has reached or is progressing toward set goals      []Patient is non-compliant or has abdicated      [x]Due to lack of appreciable progress towards set [address], PT 11/9/2017 5:14 PM

## 2020-10-02 PROBLEM — D64.9 ANEMIA: Status: ACTIVE | Noted: 2020-10-02

## 2020-10-02 RX ORDER — DIPHENHYDRAMINE HYDROCHLORIDE 50 MG/ML
25 INJECTION, SOLUTION INTRAMUSCULAR; INTRAVENOUS AS NEEDED
Status: CANCELLED
Start: 2020-10-09

## 2020-10-02 RX ORDER — ONDANSETRON 2 MG/ML
8 INJECTION INTRAMUSCULAR; INTRAVENOUS AS NEEDED
Status: CANCELLED | OUTPATIENT
Start: 2020-10-09

## 2020-10-02 RX ORDER — ACETAMINOPHEN 325 MG/1
650 TABLET ORAL AS NEEDED
Status: CANCELLED
Start: 2020-10-09

## 2020-10-02 RX ORDER — DIPHENHYDRAMINE HYDROCHLORIDE 50 MG/ML
50 INJECTION, SOLUTION INTRAMUSCULAR; INTRAVENOUS AS NEEDED
Status: CANCELLED
Start: 2020-10-09

## 2020-10-02 RX ORDER — EPINEPHRINE 1 MG/ML
0.3 INJECTION, SOLUTION, CONCENTRATE INTRAVENOUS AS NEEDED
Status: CANCELLED | OUTPATIENT
Start: 2020-10-09

## 2020-10-02 RX ORDER — HEPARIN 100 UNIT/ML
300-500 SYRINGE INTRAVENOUS AS NEEDED
Status: CANCELLED
Start: 2020-10-09

## 2020-10-02 RX ORDER — ALBUTEROL SULFATE 0.83 MG/ML
2.5 SOLUTION RESPIRATORY (INHALATION) AS NEEDED
Status: CANCELLED
Start: 2020-10-09

## 2020-10-02 RX ORDER — HYDROCORTISONE SODIUM SUCCINATE 100 MG/2ML
100 INJECTION, POWDER, FOR SOLUTION INTRAMUSCULAR; INTRAVENOUS AS NEEDED
Status: CANCELLED | OUTPATIENT
Start: 2020-10-09

## 2020-10-09 ENCOUNTER — HOSPITAL ENCOUNTER (OUTPATIENT)
Dept: INFUSION THERAPY | Age: 45
Discharge: HOME | End: 2020-10-09
Payer: COMMERCIAL

## 2020-10-09 VITALS
SYSTOLIC BLOOD PRESSURE: 110 MMHG | OXYGEN SATURATION: 100 % | TEMPERATURE: 98.2 F | HEART RATE: 82 BPM | DIASTOLIC BLOOD PRESSURE: 72 MMHG | RESPIRATION RATE: 16 BRPM

## 2020-10-09 DIAGNOSIS — D50.8 OTHER IRON DEFICIENCY ANEMIA: Primary | ICD-10-CM

## 2020-10-09 DIAGNOSIS — D50.8 OTHER IRON DEFICIENCY ANEMIA: ICD-10-CM

## 2020-10-09 PROCEDURE — 74011250636 HC RX REV CODE- 250/636: Performed by: OBSTETRICS & GYNECOLOGY

## 2020-10-09 PROCEDURE — 96365 THER/PROPH/DIAG IV INF INIT: CPT

## 2020-10-09 RX ORDER — HEPARIN 100 UNIT/ML
300-500 SYRINGE INTRAVENOUS AS NEEDED
Status: CANCELLED
Start: 2020-10-16

## 2020-10-09 RX ORDER — DIPHENHYDRAMINE HYDROCHLORIDE 50 MG/ML
50 INJECTION, SOLUTION INTRAMUSCULAR; INTRAVENOUS AS NEEDED
Status: CANCELLED
Start: 2020-10-16

## 2020-10-09 RX ORDER — EPINEPHRINE 1 MG/ML
0.3 INJECTION, SOLUTION, CONCENTRATE INTRAVENOUS AS NEEDED
Status: CANCELLED | OUTPATIENT
Start: 2020-10-16

## 2020-10-09 RX ORDER — SODIUM CHLORIDE 9 MG/ML
25 INJECTION, SOLUTION INTRAVENOUS CONTINUOUS
Status: DISPENSED | OUTPATIENT
Start: 2020-10-09 | End: 2020-10-09

## 2020-10-09 RX ORDER — SODIUM CHLORIDE 9 MG/ML
10 INJECTION INTRAMUSCULAR; INTRAVENOUS; SUBCUTANEOUS AS NEEDED
Status: ACTIVE | OUTPATIENT
Start: 2020-10-09 | End: 2020-10-09

## 2020-10-09 RX ORDER — ONDANSETRON 2 MG/ML
8 INJECTION INTRAMUSCULAR; INTRAVENOUS AS NEEDED
Status: CANCELLED | OUTPATIENT
Start: 2020-10-16

## 2020-10-09 RX ORDER — SODIUM CHLORIDE 9 MG/ML
10 INJECTION INTRAMUSCULAR; INTRAVENOUS; SUBCUTANEOUS AS NEEDED
Status: CANCELLED | OUTPATIENT
Start: 2020-10-16

## 2020-10-09 RX ORDER — SODIUM CHLORIDE 9 MG/ML
25 INJECTION, SOLUTION INTRAVENOUS CONTINUOUS
Status: CANCELLED | OUTPATIENT
Start: 2020-10-16

## 2020-10-09 RX ORDER — ACETAMINOPHEN 325 MG/1
650 TABLET ORAL AS NEEDED
Status: CANCELLED
Start: 2020-10-16

## 2020-10-09 RX ORDER — HYDROCORTISONE SODIUM SUCCINATE 100 MG/2ML
100 INJECTION, POWDER, FOR SOLUTION INTRAMUSCULAR; INTRAVENOUS AS NEEDED
Status: CANCELLED | OUTPATIENT
Start: 2020-10-16

## 2020-10-09 RX ORDER — DIPHENHYDRAMINE HYDROCHLORIDE 50 MG/ML
25 INJECTION, SOLUTION INTRAMUSCULAR; INTRAVENOUS AS NEEDED
Status: CANCELLED
Start: 2020-10-16

## 2020-10-09 RX ORDER — ALBUTEROL SULFATE 0.83 MG/ML
2.5 SOLUTION RESPIRATORY (INHALATION) AS NEEDED
Status: CANCELLED
Start: 2020-10-16

## 2020-10-09 RX ORDER — SODIUM CHLORIDE 0.9 % (FLUSH) 0.9 %
10 SYRINGE (ML) INJECTION AS NEEDED
Status: DISPENSED | OUTPATIENT
Start: 2020-10-09 | End: 2020-10-09

## 2020-10-09 RX ORDER — SODIUM CHLORIDE 0.9 % (FLUSH) 0.9 %
10 SYRINGE (ML) INJECTION AS NEEDED
Status: CANCELLED | OUTPATIENT
Start: 2020-10-16

## 2020-10-09 RX ADMIN — Medication 10 ML: at 12:13

## 2020-10-09 RX ADMIN — SODIUM CHLORIDE 25 ML/HR: 9 INJECTION, SOLUTION INTRAVENOUS at 11:23

## 2020-10-09 RX ADMIN — FERRIC CARBOXYMALTOSE INJECTION 750 MG: 50 INJECTION, SOLUTION INTRAVENOUS at 11:23

## 2020-10-09 RX ADMIN — Medication 10 ML: at 11:16

## 2020-10-09 NOTE — PROGRESS NOTES
[facility] Progress Note    Date: 2020    Name: Yudith Mayes    MRN: [id number]         : 1975    Injectafer 1 of 2      MsAbhishek Mcarthur arrived to [facility] at 1055 am ambulatory. No complaints of pain. No acute distress noted    Ms. Mcarthur was assessed and education was provided. Care notes given. Patient verbalized understanding of route, actions, side effects, possible reaction, and management if reaction occurs. Ms. Brody Hazard vitals were reviewed. Visit Vitals  /87 (BP 1 Location: Left arm, BP Patient Position: Post activity)   Pulse 84   Temp 98.3 °F (36.8 °C)   Resp 18   SpO2 100%   Breastfeeding No       #24g IV inserted in patient's LEFT AC on 2nd attempt. Positive for blood return/ flushes without difficulty. Injectafer 750 mg in 250 ml normal saline administered over 20 minutes as ordered followed by NS flush. Ms. Angela Hernandez tolerated well without complaints. Will monitor for 30 minutes    Patient without complaints or reaction 30 minutes post infusion    Patient Vitals for the past 4 hrs:   Temp Pulse Resp BP SpO2   10/09/20 1214 98.2 °F (36.8 °C) 82 16 110/72 100 %   10/09/20 1145 98.5 °F (36.9 °C) 80 16 112/76 --   10/09/20 1055 98.3 °F (36.8 °C) 84 18 136/87 100 %         IV removed/ intact. Site without bleeding, bruising, swelling or tenderness. Gauze/ coban to site. Reviewed discharge instructions with patient. She verbalized understanding. Ms. Angela Hernandez was discharged from [place] [address] in stable condition at 1215. She is to return on 10/16/20 at 1100 for her next appointment.     Merlin Alvarez RN  2020

## 2020-10-16 ENCOUNTER — HOSPITAL ENCOUNTER (OUTPATIENT)
Dept: INFUSION THERAPY | Age: 45
Discharge: HOME | End: 2020-10-16
Payer: COMMERCIAL

## 2020-10-16 VITALS
SYSTOLIC BLOOD PRESSURE: 125 MMHG | TEMPERATURE: 98.7 F | OXYGEN SATURATION: 100 % | DIASTOLIC BLOOD PRESSURE: 75 MMHG | RESPIRATION RATE: 16 BRPM | HEART RATE: 100 BPM

## 2020-10-16 DIAGNOSIS — D50.8 OTHER IRON DEFICIENCY ANEMIA: ICD-10-CM

## 2020-10-16 DIAGNOSIS — D50.8 OTHER IRON DEFICIENCY ANEMIA: Primary | ICD-10-CM

## 2020-10-16 PROCEDURE — 74011250636 HC RX REV CODE- 250/636: Performed by: OBSTETRICS & GYNECOLOGY

## 2020-10-16 PROCEDURE — 96365 THER/PROPH/DIAG IV INF INIT: CPT

## 2020-10-16 RX ORDER — EPINEPHRINE 1 MG/ML
0.3 INJECTION, SOLUTION, CONCENTRATE INTRAVENOUS AS NEEDED
Status: CANCELLED | OUTPATIENT
Start: 2020-10-16

## 2020-10-16 RX ORDER — DIPHENHYDRAMINE HYDROCHLORIDE 50 MG/ML
50 INJECTION, SOLUTION INTRAMUSCULAR; INTRAVENOUS AS NEEDED
Status: CANCELLED
Start: 2020-10-16

## 2020-10-16 RX ORDER — ALBUTEROL SULFATE 0.83 MG/ML
2.5 SOLUTION RESPIRATORY (INHALATION) AS NEEDED
Status: CANCELLED
Start: 2020-10-16

## 2020-10-16 RX ORDER — SODIUM CHLORIDE 9 MG/ML
25 INJECTION, SOLUTION INTRAVENOUS CONTINUOUS
Status: CANCELLED | OUTPATIENT
Start: 2020-10-16

## 2020-10-16 RX ORDER — SODIUM CHLORIDE 9 MG/ML
10 INJECTION INTRAMUSCULAR; INTRAVENOUS; SUBCUTANEOUS AS NEEDED
Status: CANCELLED | OUTPATIENT
Start: 2020-10-16

## 2020-10-16 RX ORDER — DIPHENHYDRAMINE HYDROCHLORIDE 50 MG/ML
25 INJECTION, SOLUTION INTRAMUSCULAR; INTRAVENOUS AS NEEDED
Status: CANCELLED
Start: 2020-10-16

## 2020-10-16 RX ORDER — SODIUM CHLORIDE 9 MG/ML
25 INJECTION, SOLUTION INTRAVENOUS CONTINUOUS
Status: DISPENSED | OUTPATIENT
Start: 2020-10-16 | End: 2020-10-16

## 2020-10-16 RX ORDER — ACETAMINOPHEN 325 MG/1
650 TABLET ORAL AS NEEDED
Status: CANCELLED
Start: 2020-10-16

## 2020-10-16 RX ORDER — HEPARIN 100 UNIT/ML
300-500 SYRINGE INTRAVENOUS AS NEEDED
Status: CANCELLED
Start: 2020-10-16

## 2020-10-16 RX ORDER — HYDROCORTISONE SODIUM SUCCINATE 100 MG/2ML
100 INJECTION, POWDER, FOR SOLUTION INTRAMUSCULAR; INTRAVENOUS AS NEEDED
Status: CANCELLED | OUTPATIENT
Start: 2020-10-16

## 2020-10-16 RX ORDER — ONDANSETRON 2 MG/ML
8 INJECTION INTRAMUSCULAR; INTRAVENOUS AS NEEDED
Status: CANCELLED | OUTPATIENT
Start: 2020-10-16

## 2020-10-16 RX ORDER — SODIUM CHLORIDE 0.9 % (FLUSH) 0.9 %
10 SYRINGE (ML) INJECTION AS NEEDED
Status: CANCELLED | OUTPATIENT
Start: 2020-10-16

## 2020-10-16 RX ADMIN — FERRIC CARBOXYMALTOSE INJECTION 750 MG: 50 INJECTION, SOLUTION INTRAVENOUS at 11:34

## 2020-10-16 NOTE — PROGRESS NOTES
[facility] Progress Note    Date: 2020    Name: Jessica Mims    MRN: [id number]         : 1975    Injectafer 2 of 2      Ms. Blue arrived to [facility] at 1055 am ambulatory. No complaints of pain. No acute distress noted    Ms. Mcarthur was assessed and education was provided. Ms. Yovani Dunbar vitals were reviewed. Visit Vitals  /79 (BP 1 Location: Left arm, BP Patient Position: Sitting)   Pulse 91   Temp 97.5 °F (36.4 °C)   Resp 16   SpO2 100%   Breastfeeding No       #24g IV inserted in patient's Rt AC on 1 attempt. Positive for blood return/ flushes without difficulty. Injectafer 750 mg in 250 ml normal saline administered over 20 minutes as ordered followed by NS flush. Ms. Gan Friday tolerated well without complaints. Patient without complaints or reaction 30 minutes post infusion    Visit Vitals  /75 (BP 1 Location: Right arm, BP Patient Position: Sitting)   Pulse 100   Temp 98.7 °F (37.1 °C)   Resp 16   SpO2 100%   Breastfeeding No           IV removed/ intact. Site without bleeding, bruising, swelling or tenderness. Gauze/ coban to site. Reviewed discharge instructions with patient. She verbalized understanding. Ms. Gan Friday was discharged from [place] [address] in stable condition at 1215. She is to f/u with Dr. Mario Alberto Mota.     Alma Storm RN  2020

## 2022-03-18 PROBLEM — D64.9 ANEMIA: Status: ACTIVE | Noted: 2020-10-02

## 2022-03-24 ENCOUNTER — TRANSCRIBE ORDER (OUTPATIENT)
Dept: SCHEDULING | Age: 47
End: 2022-03-24

## 2022-03-24 DIAGNOSIS — M54.6 ACUTE BILATERAL THORACIC BACK PAIN: ICD-10-CM

## 2022-03-24 DIAGNOSIS — M54.16 LUMBAR RADICULOPATHY: Primary | ICD-10-CM

## 2022-03-28 ENCOUNTER — HOSPITAL ENCOUNTER (OUTPATIENT)
Dept: MRI IMAGING | Age: 47
Discharge: HOME | End: 2022-03-28
Attending: STUDENT IN AN ORGANIZED HEALTH CARE EDUCATION/TRAINING PROGRAM
Payer: COMMERCIAL

## 2022-03-28 DIAGNOSIS — M54.16 LUMBAR RADICULOPATHY: ICD-10-CM

## 2022-03-28 DIAGNOSIS — M54.6 ACUTE BILATERAL THORACIC BACK PAIN: ICD-10-CM

## 2022-03-28 PROCEDURE — 72146 MRI CHEST SPINE W/O DYE: CPT

## 2022-03-28 PROCEDURE — 72148 MRI LUMBAR SPINE W/O DYE: CPT

## 2022-08-22 PROBLEM — D64.9 SYMPTOMATIC ANEMIA: Status: ACTIVE | Noted: 2022-08-22

## 2022-08-22 PROBLEM — R42 LIGHTHEADEDNESS: Status: ACTIVE | Noted: 2022-08-22

## 2022-08-22 PROBLEM — D25.9 UTERINE FIBROID: Status: ACTIVE | Noted: 2022-08-22

## 2022-08-22 PROBLEM — N92.1 METRORRHAGIA: Status: ACTIVE | Noted: 2022-08-22

## 2023-10-19 ENCOUNTER — HOSPITAL ENCOUNTER (OUTPATIENT)
Age: 48
Discharge: HOME | End: 2023-10-21
Payer: COMMERCIAL

## 2023-10-19 VITALS
DIASTOLIC BLOOD PRESSURE: 95 MMHG | HEIGHT: 59 IN | BODY MASS INDEX: 36.29 KG/M2 | SYSTOLIC BLOOD PRESSURE: 145 MMHG | WEIGHT: 180 LBS

## 2023-10-19 DIAGNOSIS — R00.0 TACHYCARDIA, UNSPECIFIED: ICD-10-CM

## 2023-10-19 PROCEDURE — 93306 TTE W/DOPPLER COMPLETE: CPT

## 2023-10-20 LAB
ECHO AO ASC DIAM: 3.4 CM
ECHO AO ASCENDING AORTA INDEX: 1.93 CM/M2
ECHO AO ROOT DIAM: 3.1 CM
ECHO AO ROOT INDEX: 1.76 CM/M2
ECHO AV AREA PEAK VELOCITY: 2.3 CM2
ECHO AV AREA VTI: 2.5 CM2
ECHO AV AREA/BSA PEAK VELOCITY: 1.3 CM2/M2
ECHO AV AREA/BSA VTI: 1.4 CM2/M2
ECHO AV MEAN GRADIENT: 6 MMHG
ECHO AV MEAN VELOCITY: 1.1 M/S
ECHO AV PEAK GRADIENT: 10 MMHG
ECHO AV PEAK VELOCITY: 1.6 M/S
ECHO AV VELOCITY RATIO: 0.63
ECHO AV VTI: 30.3 CM
ECHO BSA: 1.84 M2
ECHO EST RA PRESSURE: 3 MMHG
ECHO LA DIAMETER INDEX: 2.05 CM/M2
ECHO LA DIAMETER: 3.6 CM
ECHO LA TO AORTIC ROOT RATIO: 1.16
ECHO LA VOL 2C: 45 ML (ref 22–52)
ECHO LA VOL 4C: 47 ML (ref 22–52)
ECHO LA VOL BP: 46 ML (ref 22–52)
ECHO LA VOL/BSA BIPLANE: 26 ML/M2 (ref 16–34)
ECHO LA VOLUME AREA LENGTH: 49 ML
ECHO LA VOLUME INDEX A2C: 26 ML/M2 (ref 16–34)
ECHO LA VOLUME INDEX A4C: 27 ML/M2 (ref 16–34)
ECHO LA VOLUME INDEX AREA LENGTH: 28 ML/M2 (ref 16–34)
ECHO LV E' LATERAL VELOCITY: 13 CM/S
ECHO LV E' SEPTAL VELOCITY: 10 CM/S
ECHO LV EJECTION FRACTION A2C: 57 %
ECHO LV EJECTION FRACTION A4C: 60 %
ECHO LV EJECTION FRACTION BIPLANE: 60 % (ref 55–100)
ECHO LV FRACTIONAL SHORTENING: 38 % (ref 28–44)
ECHO LV GLOBAL LONGITUDINAL STRAIN (GLS): -15.2 %
ECHO LV INTERNAL DIMENSION DIASTOLE INDEX: 2.22 CM/M2
ECHO LV INTERNAL DIMENSION DIASTOLIC: 3.9 CM (ref 3.9–5.3)
ECHO LV INTERNAL DIMENSION SYSTOLIC INDEX: 1.36 CM/M2
ECHO LV INTERNAL DIMENSION SYSTOLIC: 2.4 CM
ECHO LV IVSD: 1 CM (ref 0.6–0.9)
ECHO LV MASS 2D: 113.6 G (ref 67–162)
ECHO LV MASS INDEX 2D: 64.5 G/M2 (ref 43–95)
ECHO LV POSTERIOR WALL DIASTOLIC: 0.9 CM (ref 0.6–0.9)
ECHO LV RELATIVE WALL THICKNESS RATIO: 0.46
ECHO LVOT AREA: 3.5 CM2
ECHO LVOT AV VTI INDEX: 0.72
ECHO LVOT DIAM: 2.1 CM
ECHO LVOT MEAN GRADIENT: 3 MMHG
ECHO LVOT PEAK GRADIENT: 4 MMHG
ECHO LVOT PEAK VELOCITY: 1 M/S
ECHO LVOT STROKE VOLUME INDEX: 42.9 ML/M2
ECHO LVOT SV: 75.5 ML
ECHO LVOT VTI: 21.8 CM
ECHO MV A VELOCITY: 0.74 M/S
ECHO MV AREA VTI: 3.7 CM2
ECHO MV E DECELERATION TIME (DT): 181.1 MS
ECHO MV E VELOCITY: 0.93 M/S
ECHO MV E/A RATIO: 1.26
ECHO MV E/E' LATERAL: 7.15
ECHO MV E/E' RATIO (AVERAGED): 8.23
ECHO MV E/E' SEPTAL: 9.3
ECHO MV LVOT VTI INDEX: 0.94
ECHO MV MAX VELOCITY: 0.8 M/S
ECHO MV MEAN GRADIENT: 2 MMHG
ECHO MV MEAN VELOCITY: 0.6 M/S
ECHO MV PEAK GRADIENT: 3 MMHG
ECHO MV VTI: 20.4 CM
ECHO PV MAX VELOCITY: 1.2 M/S
ECHO PV MEAN GRADIENT: 3 MMHG
ECHO PV MEAN VELOCITY: 0.9 M/S
ECHO PV PEAK GRADIENT: 6 MMHG
ECHO RA END SYSTOLIC VOLUME APICAL 4 CHAMBER INDEX BSA: 19 ML/M2
ECHO RA VOLUME BIPLANE METHOD OF DISKS: 32 ML
ECHO RA VOLUME INDEX BP: 18 ML/M2
ECHO RA VOLUME: 33 ML
ECHO RIGHT VENTRICULAR SYSTOLIC PRESSURE (RVSP): 20 MMHG
ECHO RV INTERNAL DIMENSION: 2.5 CM
ECHO RV TAPSE: 2.3 CM (ref 1.7–?)
ECHO TV REGURGITANT MAX VELOCITY: 2.09 M/S
ECHO TV REGURGITANT PEAK GRADIENT: 17 MMHG